# Patient Record
(demographics unavailable — no encounter records)

---

## 2017-03-19 NOTE — ED
Ysabel SCHULTZ SooYoung, scribed for Porfirio Aguilar MD on 17 at 1200 .





Allergic Reaction/Systemic





- HPI Summary


HPI Summary: 





A 26 y/o F BIBA presents to ED after an allergic reaction onset PTA. Pt was 

eating a chicken sub when she became very pruritic, and was unable to breathe. 

She used her epi-pen. In ED, she states feeling OK, but feels as though "it's 

getting harder to breathe." She notes that the bottom of her throat hurts, but 

the top feels fine. 











- History of Current Complaint


Chief Complaint: EDRespiratoryDistress


Time Seen by Provider: 17 11:55


Hx Obtained From: Patient


Hx Last Menstrual Period: one week ago


Onset/Duration: Sudden Onset, Started minutes ago, Still Present


Timing: Constant





- Allergies/Home Medications


Allergies/Adverse Reactions: 


 Allergies











Allergy/AdvReac Type Severity Reaction Status Date / Time


 


mushrooms Allergy Severe Anaphylatic Uncoded 16 21:23





   Shock  














PMH/Surg Hx/FS Hx/Imm Hx


Previously Healthy: Yes


Neurological History: Reports: Hx Headaches - SINCE 


Psychiatric History: Reports: Hx Depression





- Surgical History


Surgery Procedure, Year, and Place:  , T&A 


Infectious Disease History: No


Infectious Disease History: 


   Denies: Traveled Outside the [place] in Last 30 Days





- Family History


Known Family History: Positive: None


   Negative: Cardiac Disease, Diabetes





- Social History


Occupation: Unemployed - OTHER


Lives: With Family


Alcohol Use: None


Hx Substance Use: No


Substance Use Type: Reports: None


Hx Tobacco Use: No


Smoking Status (MU): Never Smoked Tobacco





Review of Systems


Positive: Other - allergic reaction


Positive: Shortness Of Breath


Positive: Other - pruritic, erythematous 


All Other Systems Reviewed And Are Negative: Yes





Physical Exam


Triage Information Reviewed: Yes


Vital Signs On Initial Exam: 


 Initial Vitals











Temp Pulse Resp BP Pulse Ox


 


 98.4 F   132   40   138/78   100 


 


 17 11:44  17 11:44  17 11:44  17 11:44  17 11:44











Vital Signs Reviewed: Yes


Appearance: Positive: Well-Appearing, No Pain Distress


Skin: Positive: Warm, Skin Color Reflects Adequate Perfusion, Dry


Head/Face: Positive: Normal Head/Face Inspection


Eyes: Positive: Normal


ENT: Positive: Normal ENT inspection


Neck: Positive: Supple, Nontender


Respiratory/Lung Sounds: Positive: Clear to Auscultation, Breath Sounds Present


Cardiovascular: Positive: Tachycardia


Abdomen Description: Positive: Nontender, Soft


Bowel Sounds: Positive: Present


Musculoskeletal: Positive: Normal


Neurological: Positive: Normal


Psychiatric: Positive: Anxious





- Carlos Coma Scale


Coma Scale Total: 15





Diagnostics





- Vital Signs


 Vital Signs











  Temp Pulse Resp BP Pulse Ox


 


 17 11:54    24  


 


 17 11:50  98.4 F  128  23  122/62  100


 


 17 11:44  98.4 F  132  40  138/78  100














- Laboratory


Lab Statement: Any lab studies that have been ordered have been reviewed, and 

results considered in the medical decision making process.





- EKG


  ** 1200


EKG Rhythm: Sinus Tachycardia





Allergic Reaction Course/Dx





- Course


Course Of Treatment: Agueda Paez presented after accidently ingesting mushroom 

in a sandwich.  EMS found her in extremis and administered her her own epi-pen 

as well as multiple other medications.  She presented anxious and tachycardic 

and feeling still SOB.  She was given hydroxyzine her for antihistamine and 

anxiolytic effects and observed.  She improved a lot and her vitals normalized.

  She was observed until the epi had worn off.  I prescribed another epi-pen 

for her but she has no insurance or money.  I got SS's involved but there was 

no easy solution.  I recommended that she carry benadryl at all times and call 

[phone] at the first sign of allergic reaction.





- Diagnoses


Provider Diagnoses: 


 food allergy








Discharge





- Discharge Plan


Condition: Stable


Disposition: HOME


Patient Education Materials:  Anaphylaxis (ED), Food Allergy (ED)


Referrals: 


Neftaly Holguin NP [Primary Care Provider] - 





The documentation as recorded by the Ysabel duarte SooYoung accurately 

reflects the service I personally performed and the decisions made by me, Porfirio Aguilar MD.

## 2017-09-14 NOTE — UC
Abdominal Pain Female HPI





- HPI Summary


HPI Summary: 





This is a 27 yo female who recently found out she was pregnant who presents 

with c/o LLQ abdominal pain.  She believes her LMP was 17.  She denies 

vaginal discharge or bleeding.  No dysuria or hematuria.  She has been 

intermittently nauseous with vomiting.  No fever.  





- History of Current Complaint


Chief Complaint: UCAbdominalPain


Stated Complaint: ABDOMINAL PAIN


Hx Last Menstrual Period: 17


Allergies/Adverse Reactions: 


 Allergies











Allergy/AdvReac Type Severity Reaction Status Date / Time


 


mushrooms Allergy Severe Anaphylatic Uncoded 17 20:01





   Shock  














PMH/Surg Hx/FS Hx/Imm Hx


Previously Healthy: Yes - pregnant





- Surgical History


Surgical History: Yes


Surgery Procedure, Year, and Place:  , T&A 





- Family History


Known Family History: Positive: None


   Negative: Cardiac Disease, Diabetes





- Social History


Alcohol Use: None


Substance Use Type: None


Smoking Status (MU): Never Smoked Tobacco





- Immunization History


Most Recent Influenza Vaccination: none


Most Recent Tetanus Shot: declined


Most Recent Pneumonia Vaccination: none





Review of Systems


Constitutional: Negative


Skin: Negative


Eyes: Negative


ENT: Negative


Respiratory: Negative


Cardiovascular: Negative


Gastrointestinal: Abdominal Pain, Vomiting, Nausea


Genitourinary: Negative


Motor: Negative


Neurovascular: Negative


Musculoskeletal: Negative


Neurological: Negative


Psychological: Negative


Is Patient Immunocompromised?: No


All Other Systems Reviewed And Are Negative: Yes





Physical Exam


Triage Information Reviewed: Yes


Appearance: Well-Appearing


Vital Signs: 


 Initial Vital Signs











Temp  98.1 F   17 19:57


 


Pulse  106   17 19:57


 


Resp  16   17 19:57


 


BP  131/95   17 19:57


 


Pulse Ox  100   17 19:57











Vital Signs Reviewed: Yes


ENT Exam: Normal


ENT: Positive: Hearing grossly normal


Neck exam: Normal


Neck: Positive: Supple, Nontender


Respiratory: Positive: Lungs clear, Normal breath sounds.  Negative: Crackles, 

Rhonchi, Wheezing


Cardiovascular: Positive: RRR, No Murmur


Abdomen Description: Positive: Soft, CVA Tenderness (L), Other: - mild LLQ


Bowel Sounds: Positive: Present


Musculoskeletal: Positive: Strength Intact


Neurological: Positive: Alert, Muscle Tone Normal


Psychological Exam: Normal


Skin Exam: Normal


Skin: Negative: rashes





Diagnostics





- Laboratory


Diagnostic Studies Completed/Ordered: UA - +blood, +LE





Abd Pain Female Course/Dx





- Course


Course Of Treatment: This is a 27 yo female who recently learned she was 

pregnant who presents with c/o abd pain.  No assoc vag bleeding and UA is 

suggestive of possible UTI.  US is necessary to assess in the setting of preg 

which is not available at this time.  Recommend that she proceed to the ER for 

evaluation. She is stable to drive herself.





- Differential Dx/Diagnosis


Differential Diagnosis: Appendicitis, Pancreatitis, Pelvic Inflammatory Disease

, Pregnancy


Provider Diagnoses: 1. Abdominal pain during early pregnancy





Discharge





- Discharge Plan


Condition: Stable


Disposition: HOME


Patient Education Materials:  Abdominal Pain in Pregnancy  (ED)


Referrals: 


Neftaly Holguin NP [Primary Care Provider] - If Needed


Additional Instructions: 


Instructions:


1. PLEASE PROCEED TO THE ER FOR FURTHER EVALUATION

## 2017-09-14 NOTE — ED
Abdominal Pain/Female





- HPI Summary


HPI Summary: 





Patient presents to the ED with CC of left sided groin pain which she is 

concerned with ectopic.  She took an at home pregnancy test 3 days which came 

back positive.  AARON is Dr. Carvalho and has appt  next week.  She has been on 

OCP's for months, and despite the medication, has become pregnant without 

missing any doses.  She is .  Groin pain began this morning without known 

injury or muscle strain.  Denies urinary symptoms including hematuria.  Denies 

history of kidney stones.  Notes to mild left back pain yesterday, but denies 

any today.  Pain is 2/10, constant and aching.  Denies vaginal bleeding or 

discharge.  Hx of ovarian cysts.  Past surgical history includes . 





- History of Current Complaint


Chief Complaint: EDAbdPain


Stated Complaint: PREGNANT/ABD PAIN


Time Seen by Provider: 17 21:54


Hx Obtained From: Patient


Hx Last Menstrual Period: one week ago


Pregnant?: Yes


Onset/Duration: Sudden Onset


Timing: Constant


Severity Initially: Mild


Severity Currently: Mild


Pain Intensity: 5


Pain Scale Used: 0-10 Numeric


Location: Discrete At: LLQ


Radiates: No


Character: Cramping


Aggravating Factor(s): Nothing


Alleviating Factor(s): Nothing


Associated Signs and Symptoms: Positive: Negative.  Negative: Vaginal Discharge

, Nausea, Vomiting, Diarrhea





- Risk Factors


Ectopic Pregnancy Risk Factor: Negative


Ovarian Torsion Risk Factor: Reproductive Age, Ovarian Cysts/Tumors


Allergies/Adverse Reactions: 


 Allergies











Allergy/AdvReac Type Severity Reaction Status Date / Time


 


mushrooms Allergy Severe Anaphylatic Uncoded 17 20:56





   Shock  














PMH/Surg Hx/FS Hx/Imm Hx


Previously Healthy: Yes


Neurological History: Reports: Hx Headaches - SINCE 


Psychiatric History: Reports: Hx Depression





- Surgical History


Surgery Procedure, Year, and Place:  , T&A 





- Immunization History


Hx Pertussis Vaccination: No


Immunizations Up to Date: Unable to Obtain/Confirm


Infectious Disease History: No


Infectious Disease History: 


   Denies: Traveled Outside the [place] in Last 30 Days





- Family History


Known Family History: Positive: None


   Negative: Cardiac Disease, Diabetes





- Social History


Occupation: Employed Part-time


Lives: With Family


Alcohol Use: None


Hx Substance Use: No


Substance Use Type: Reports: None


Hx Tobacco Use: No


Smoking Status (MU): Never Smoked Tobacco





Review of Systems


Constitutional: Negative


Negative: Fever, Chills, Fatigue


Cardiovascular: Negative


Respiratory: Negative


Positive: Abdominal Pain - LLQ pain


Positive: no symptoms reported, see HPI


Neurological: Negative


Psychological: Normal


All Other Systems Reviewed And Are Negative: Yes





Physical Exam


Triage Information Reviewed: Yes


Vital Signs On Initial Exam: 


 Initial Vitals











Temp Pulse Resp BP Pulse Ox


 


 98.0 F   102   18   148/87   100 


 


 17 20:52  17 20:52  17 20:52  17 20:52  17 20:52











Vital Signs Reviewed: Yes


Appearance: Positive: Well-Appearing, Well-Nourished


Skin: Positive: Warm, Skin Color Reflects Adequate Perfusion


Head/Face: Positive: Normal Head/Face Inspection


Eyes: Positive: EOMI, ROJELIO, Conjunctiva Clear


Neck: Positive: Supple, No Lymphadenopathy


Respiratory/Lung Sounds: Positive: Clear to Auscultation, Breath Sounds Present


Cardiovascular: Positive: Normal, RRR, Pulses are Symmetrical in both Upper and 

Lower Extremities


Abdomen Description: Positive: Nontender, Soft.  Negative: CVA Tenderness (R), 

CVA Tenderness (L)


Bowel Sounds: Positive: Present


Musculoskeletal: Positive: Normal, Strength/ROM Intact


Neurological: Positive: Sensory/Motor Intact, Alert, Oriented to Person Place, 

Time, Speech Normal


Psychiatric: Positive: Normal


AVPU Assessment: Alert





Diagnostics





- Vital Signs


 Vital Signs











  Temp Pulse Resp BP Pulse Ox


 


 17 22:16   109    99


 


 17 22:13     134/70 


 


 17 20:56  98.0 F  102  18  148/87  100


 


 17 20:52  98.0 F  102  18  148/87  100














- Laboratory


Lab Results: 


 Lab Results











  17 Range/Units





  22:10 22:10 22:10 


 


WBC   9.5   (3.5-10.8)  10^3/ul


 


RBC   4.32   (4.0-5.4)  10^6/ul


 


Hgb   13.4   (12.0-16.0)  g/dl


 


Hct   39   (35-47)  %


 


MCV   90   (80-97)  fL


 


MCH   31   (27-31)  pg


 


MCHC   35   (31-36)  g/dl


 


RDW   13   (10.5-15)  %


 


Plt Count   250   (150-450)  10^3/ul


 


MPV   8   (7.4-10.4)  um3


 


Neut % (Auto)   65.9   (38-83)  %


 


Lymph % (Auto)   28.1   (25-47)  %


 


Mono % (Auto)   5.4   (1-9)  %


 


Eos % (Auto)   0.3   (0-6)  %


 


Baso % (Auto)   0.3   (0-2)  %


 


Absolute Neuts (auto)   6.3   (1.5-7.7)  10^3/ul


 


Absolute Lymphs (auto)   2.7   (1.0-4.8)  10^3/ul


 


Absolute Monos (auto)   0.5   (0-0.8)  10^3/ul


 


Absolute Eos (auto)   0   (0-0.6)  10^3/ul


 


Absolute Basos (auto)   0   (0-0.2)  10^3/ul


 


Absolute Nucleated RBC   0   10^3/ul


 


Nucleated RBC %   0   


 


Sodium  136    (133-145)  mmol/L


 


Potassium  3.5    (3.5-5.0)  mmol/L


 


Chloride  104    (101-111)  mmol/L


 


Carbon Dioxide  24    (22-32)  mmol/L


 


Anion Gap  8    (2-11)  mmol/L


 


BUN  10    (6-24)  mg/dL


 


Creatinine  0.75    (0.51-0.95)  mg/dL


 


Est GFR ( Amer)  118.3    (>60)  


 


Est GFR (Non-Af Amer)  92.0    (>60)  


 


BUN/Creatinine Ratio  13.3    (8-20)  


 


Glucose  94    ()  mg/dL


 


Lactic Acid    0.6  (0.5-2.0)  mmol/L


 


Calcium  9.7    (8.6-10.3)  mg/dL


 


Magnesium  1.9    (1.9-2.7)  mg/dL


 


Total Bilirubin  0.40    (0.2-1.0)  mg/dL


 


AST  16    (13-39)  U/L


 


ALT  15    (7-52)  U/L


 


Alkaline Phosphatase  45    ()  U/L


 


Total Creatine Kinase  72    ()  U/L


 


C-Reactive Protein  6.03 H    (< 5.00)  mg/L


 


Total Protein  7.0    (6.4-8.9)  g/dL


 


Albumin  4.3    (3.2-5.2)  g/dL


 


Globulin  2.7    (2-4)  g/dL


 


Albumin/Globulin Ratio  1.6    (1-3)  


 


Lipase  22    (11.0-82.0)  U/L


 


Beta HCG, Quant  1801.00    mIU/mL


 


Urine Color     


 


Urine Appearance     


 


Urine pH     (5-9)  


 


Ur Specific Gravity     (1.010-1.030)  


 


Urine Protein     (Negative)  


 


Urine Ketones     (Negative)  


 


Urine Blood     (Negative)  


 


Urine Nitrate     (Negative)  


 


Urine Bilirubin     (Negative)  


 


Urine Urobilinogen     (Negative)  


 


Ur Leukocyte Esterase     (Negative)  


 


Urine WBC (Auto)     (Absent)  


 


Urine RBC (Auto)     (Absent)  


 


Ur Squamous Epith Cells     (Absent)  


 


Urine Bacteria     (Absent)  


 


Urine Glucose     (Negative)  














  17 Range/Units





  22:20 


 


WBC   (3.5-10.8)  10^3/ul


 


RBC   (4.0-5.4)  10^6/ul


 


Hgb   (12.0-16.0)  g/dl


 


Hct   (35-47)  %


 


MCV   (80-97)  fL


 


MCH   (27-31)  pg


 


MCHC   (31-36)  g/dl


 


RDW   (10.5-15)  %


 


Plt Count   (150-450)  10^3/ul


 


MPV   (7.4-10.4)  um3


 


Neut % (Auto)   (38-83)  %


 


Lymph % (Auto)   (25-47)  %


 


Mono % (Auto)   (1-9)  %


 


Eos % (Auto)   (0-6)  %


 


Baso % (Auto)   (0-2)  %


 


Absolute Neuts (auto)   (1.5-7.7)  10^3/ul


 


Absolute Lymphs (auto)   (1.0-4.8)  10^3/ul


 


Absolute Monos (auto)   (0-0.8)  10^3/ul


 


Absolute Eos (auto)   (0-0.6)  10^3/ul


 


Absolute Basos (auto)   (0-0.2)  10^3/ul


 


Absolute Nucleated RBC   10^3/ul


 


Nucleated RBC %   


 


Sodium   (133-145)  mmol/L


 


Potassium   (3.5-5.0)  mmol/L


 


Chloride   (101-111)  mmol/L


 


Carbon Dioxide   (22-32)  mmol/L


 


Anion Gap   (2-11)  mmol/L


 


BUN   (6-24)  mg/dL


 


Creatinine   (0.51-0.95)  mg/dL


 


Est GFR ( Amer)   (>60)  


 


Est GFR (Non-Af Amer)   (>60)  


 


BUN/Creatinine Ratio   (8-20)  


 


Glucose   ()  mg/dL


 


Lactic Acid   (0.5-2.0)  mmol/L


 


Calcium   (8.6-10.3)  mg/dL


 


Magnesium   (1.9-2.7)  mg/dL


 


Total Bilirubin   (0.2-1.0)  mg/dL


 


AST   (13-39)  U/L


 


ALT   (7-52)  U/L


 


Alkaline Phosphatase   ()  U/L


 


Total Creatine Kinase   ()  U/L


 


C-Reactive Protein   (< 5.00)  mg/L


 


Total Protein   (6.4-8.9)  g/dL


 


Albumin   (3.2-5.2)  g/dL


 


Globulin   (2-4)  g/dL


 


Albumin/Globulin Ratio   (1-3)  


 


Lipase   (11.0-82.0)  U/L


 


Beta HCG, Quant   mIU/mL


 


Urine Color  Yellow  


 


Urine Appearance  Clear  


 


Urine pH  6.0  (5-9)  


 


Ur Specific Gravity  1.011  (1.010-1.030)  


 


Urine Protein  Negative  (Negative)  


 


Urine Ketones  Negative  (Negative)  


 


Urine Blood  2+ H  (Negative)  


 


Urine Nitrate  Negative  (Negative)  


 


Urine Bilirubin  Negative  (Negative)  


 


Urine Urobilinogen  Negative  (Negative)  


 


Ur Leukocyte Esterase  Trace H  (Negative)  


 


Urine WBC (Auto)  Trace(0-5/hpf)  (Absent)  


 


Urine RBC (Auto)  Trace(0-2/hpf)  (Absent)  


 


Ur Squamous Epith Cells  Present H  (Absent)  


 


Urine Bacteria  Absent  (Absent)  


 


Urine Glucose  Negative  (Negative)  











Result Diagrams: 


 17 22:10





 17 22:10


Lab Statement: Any lab studies that have been ordered have been reviewed, and 

results considered in the medical decision making process.





Abdominal Pain Fem Course/Dx





- Course


Course Of Treatment: Patient sent to US for r/o torsion and ectopic.  US shows 

4 weeks 6 days gestational sac.  No ovarian torsion, ectopic or cysts 

indentified.  Pain could be a muscular strain in the groin or kidney stone.  

Patient denies current back pain or hematuria or any urinary symptoms.  

Treatment options explained.  Patient would like to be discharge and follow up 

with Maia next week.  She states the pain is mild at 2/10.  UA shows blood, 

but no leuks.  The pain could be from a kidney stone and patient continues to 

state she would like to go home and will return if symptoms worsen.  She is 

discharged and follow up given.





- Diagnoses


Differential Diagnosis: Positive: Other - UTI, ectopic, normal pregnancy, 

kidney stone


Provider Diagnoses: 


 Pregnancy, LLQ pain








Discharge





- Discharge Plan


Condition: Stable


Disposition: HOME


Patient Education Materials:  Pregnancy (ED)


Referrals: 


Ramesh Carvalho MD [Medical Doctor] - 


Neftaly Holguin NP [Primary Care Provider] - 


Additional Instructions: 


Follow up with Dr. Carvalho


If symptoms become worse, please return to the ED


Tylenol for any discomfort


As discussed, this could be a strained muscle which could be improved with 

moist heat to the area.


Urine looked OK and without infection.

## 2017-09-15 NOTE — RAD
HISTORY: Left lower quadrant pain



COMPARISONS: None relevant



TECHNIQUE: Multiple transverse and longitudinal ultrasound images were obtained of the

pelvis using grayscale, color Doppler, and spectral Doppler imaging using the endovaginal

transducer.



FINDINGS:



UTERUS:  The uterus is normal in shape, size, contour, and echotexture. Cervical nabothian

cysts are noted.



GESTATION: There is a gestational sac. No fetal pole is identified. The mean sac diameter

measures 0.41 cm for a gestational age of 4 weeks and 6 days. The ALEKSEY is May 18, 2013.

Fetal cardiac motion is not detected.

Gross fetal movement is not identified.

Fetal anatomy cannot be assessed secondary to early dates.

The amniotic fluid is qualitatively normal.

There are no retroplacental fluid collections.



CUL-DE-SAC: There is a small amount of simple fluid within the cul-de-sac. This may be

physiologic in a reproductive age female.



RIGHT OVARY: The right ovary measures 4.4 x 3.1 x 2.7 cm.  A follicular cyst is noted.

Normal arterial and venous waveforms are identifiable within the ovary on spectral Doppler

imaging.     

LEFT OVARY: The left ovary measures 2.4 x 1.9 x 1.6 cm.  Multiple small follicles are

noted. Normal arterial and venous waveforms are identifiable within the ovary on spectral

Doppler imaging.     



BLADDER: The bladder is not well visualized.



IMPRESSION: 

1.  A GESTATIONAL SAC WITHOUT FETAL POLE IS IDENTIFIED. THE GESTATIONAL AGE BY MEAN SAC

DIAMETER IS 4 WEEKS AND 6 DAYS. RECOMMEND ATTENTION ON FOLLOW-UP IMAGING CORRELATION WITH

SERIAL BETA-HCG LEVELS.

2.  NO SONOGRAPHIC FEATURES OF TORSION. PLEASE NOTE THAT PARTIAL OR INTERMITTENT TORSION

MAY BE SONOGRAPHICALLY NORMAL.